# Patient Record
Sex: MALE | Race: WHITE | URBAN - METROPOLITAN AREA
[De-identification: names, ages, dates, MRNs, and addresses within clinical notes are randomized per-mention and may not be internally consistent; named-entity substitution may affect disease eponyms.]

---

## 2023-04-04 ENCOUNTER — APPOINTMENT (RX ONLY)
Dept: URBAN - METROPOLITAN AREA CLINIC 76 | Facility: CLINIC | Age: 48
Setting detail: DERMATOLOGY
End: 2023-04-04

## 2023-04-04 ENCOUNTER — RX ONLY (OUTPATIENT)
Age: 48
Setting detail: RX ONLY
End: 2023-04-04

## 2023-04-04 DIAGNOSIS — D22 MELANOCYTIC NEVI: ICD-10-CM

## 2023-04-04 DIAGNOSIS — L21.8 OTHER SEBORRHEIC DERMATITIS: ICD-10-CM

## 2023-04-04 DIAGNOSIS — L81.4 OTHER MELANIN HYPERPIGMENTATION: ICD-10-CM

## 2023-04-04 DIAGNOSIS — Z71.89 OTHER SPECIFIED COUNSELING: ICD-10-CM

## 2023-04-04 DIAGNOSIS — L71.8 OTHER ROSACEA: ICD-10-CM

## 2023-04-04 DIAGNOSIS — L82.1 OTHER SEBORRHEIC KERATOSIS: ICD-10-CM

## 2023-04-04 DIAGNOSIS — R21 RASH AND OTHER NONSPECIFIC SKIN ERUPTION: ICD-10-CM

## 2023-04-04 DIAGNOSIS — D18.0 HEMANGIOMA: ICD-10-CM

## 2023-04-04 PROBLEM — D22.72 MELANOCYTIC NEVI OF LEFT LOWER LIMB, INCLUDING HIP: Status: ACTIVE | Noted: 2023-04-04

## 2023-04-04 PROBLEM — D22.5 MELANOCYTIC NEVI OF TRUNK: Status: ACTIVE | Noted: 2023-04-04

## 2023-04-04 PROBLEM — D23.5 OTHER BENIGN NEOPLASM OF SKIN OF TRUNK: Status: ACTIVE | Noted: 2023-04-04

## 2023-04-04 PROBLEM — D18.01 HEMANGIOMA OF SKIN AND SUBCUTANEOUS TISSUE: Status: ACTIVE | Noted: 2023-04-04

## 2023-04-04 PROCEDURE — 99204 OFFICE O/P NEW MOD 45 MIN: CPT

## 2023-04-04 PROCEDURE — ? SUNSCREEN RECOMMENDATIONS

## 2023-04-04 PROCEDURE — ? TREATMENT REGIMEN

## 2023-04-04 PROCEDURE — ? DEFER

## 2023-04-04 PROCEDURE — ? OBSERVATION AND MEASURE

## 2023-04-04 PROCEDURE — ? PRESCRIPTION

## 2023-04-04 PROCEDURE — ? COUNSELING

## 2023-04-04 RX ORDER — METRONIDAZOLE 7.5 MG/G
GEL TOPICAL
Qty: 45 | Refills: 2 | Status: CANCELLED | COMMUNITY
Start: 2023-04-04

## 2023-04-04 RX ORDER — METRONIDAZOLE 7.5 MG/G
GEL TOPICAL
Qty: 45 | Refills: 2 | Status: ERX

## 2023-04-04 RX ORDER — HYDROCORTISONE 25 MG/G
CREAM TOPICAL
Qty: 30 | Refills: 1 | Status: ERX | COMMUNITY
Start: 2023-04-04

## 2023-04-04 RX ADMIN — HYDROCORTISONE: 25 CREAM TOPICAL at 00:00

## 2023-04-04 RX ADMIN — METRONIDAZOLE: 7.5 GEL TOPICAL at 00:00

## 2023-04-04 ASSESSMENT — LOCATION ZONE DERM
LOCATION ZONE: SCALP
LOCATION ZONE: FACE
LOCATION ZONE: TOE
LOCATION ZONE: FEET
LOCATION ZONE: TRUNK

## 2023-04-04 ASSESSMENT — LOCATION SIMPLE DESCRIPTION DERM
LOCATION SIMPLE: LEFT PLANTAR SURFACE
LOCATION SIMPLE: UPPER BACK
LOCATION SIMPLE: LEFT CHEEK
LOCATION SIMPLE: LEFT GREAT TOE
LOCATION SIMPLE: ABDOMEN
LOCATION SIMPLE: LEFT SCALP

## 2023-04-04 ASSESSMENT — LOCATION DETAILED DESCRIPTION DERM
LOCATION DETAILED: LEFT INSTEP
LOCATION DETAILED: INFERIOR THORACIC SPINE
LOCATION DETAILED: LEFT LATERAL GREAT TOE
LOCATION DETAILED: LEFT CENTRAL MALAR CHEEK
LOCATION DETAILED: PERIUMBILICAL SKIN
LOCATION DETAILED: LEFT MEDIAL FRONTAL SCALP

## 2023-04-04 NOTE — PROCEDURE: OBSERVATION
Detail Level: Detailed
Size Of Lesion: 4x5mm
Size Of Lesion: 2x3mm
Size Of Lesion: 5x3mm
Size Of Lesion: 1x1mm

## 2023-04-04 NOTE — PROCEDURE: DEFER
X Size Of Lesion In Cm (Optional): 0.5
Detail Level: Detailed
Size Of Lesion In Cm (Optional): 0.4
Procedure To Be Performed At Next Visit: Biopsy by punch method
Introduction Text (Please End With A Colon): The following procedure was deferred:

## 2023-04-04 NOTE — HPI: TOTAL BODY SKIN EXAM (PATIENT REPORTED)
Do You Have Any Concerning Skin Lesions Today?  If No, You Do Not Have To Fill Out The Remainder Of The Questions.: yes
Where Is Your Skin Lesion(S) Of Concern Located?: Face
Additional Comments (Use Complete Sentences): Patient states that these red spots pop up after he has been outside. They do not cause him any symptoms, the redness usually goes away in a couple of days

## 2023-04-04 NOTE — PROCEDURE: TREATMENT REGIMEN
Continue Regimen: selsun blue shampoo or Nizerol
Initiate Treatment: Hydrocortisone 2.5% cream twice daily for flares
Plan: Discussed ketoconazole shampoo if OTC treatment doesn?t continue to keep it under control
Detail Level: Zone
Initiate Treatment: Hydrocortisone 2.5% cream twice daily until clear
Initiate Treatment: Metronidazole 0.75% gel apply to face twice daily